# Patient Record
Sex: MALE | Race: WHITE | Employment: FULL TIME | URBAN - METROPOLITAN AREA
[De-identification: names, ages, dates, MRNs, and addresses within clinical notes are randomized per-mention and may not be internally consistent; named-entity substitution may affect disease eponyms.]

---

## 2016-12-16 LAB
ALBUMIN SERPL-MCNC: 4.1 G/DL
ALP BLD-CCNC: 111 U/L
ALT SERPL-CCNC: 78 U/L
AST SERPL-CCNC: 35 U/L
BILIRUB SERPL-MCNC: 0.6 MG/DL (ref 0.1–1.4)
BUN BLDV-MCNC: 15 MG/DL
CALCIUM SERPL-MCNC: 9.1 MG/DL
CHLORIDE BLD-SCNC: 106 MMOL/L
CHOLESTEROL, TOTAL: 166 MG/DL
CHOLESTEROL/HDL RATIO: 5.4
CO2: 25 MMOL/L
CREAT SERPL-MCNC: 1.09 MG/DL
CREATININE URINE: NORMAL MG/DL
GFR CALCULATED: NORMAL
GLUCOSE BLD-MCNC: 86 MG/DL
HDLC SERPL-MCNC: 31 MG/DL (ref 35–70)
LDL CHOLESTEROL CALCULATED: 90 MG/DL (ref 0–160)
MICROALBUMIN/CREAT 24H UR: <12 MG/G{CREAT}
POTASSIUM SERPL-SCNC: 4.6 MMOL/L
SODIUM BLD-SCNC: 142 MMOL/L
TOTAL PROTEIN: 6.9
TRIGL SERPL-MCNC: 224 MG/DL
VLDLC SERPL CALC-MCNC: 45 MG/DL

## 2017-03-13 ENCOUNTER — OFFICE VISIT (OUTPATIENT)
Dept: PRIMARY CARE CLINIC | Age: 37
End: 2017-03-13
Payer: COMMERCIAL

## 2017-03-13 VITALS
HEART RATE: 80 BPM | WEIGHT: 218 LBS | RESPIRATION RATE: 20 BRPM | BODY MASS INDEX: 31.21 KG/M2 | SYSTOLIC BLOOD PRESSURE: 140 MMHG | HEIGHT: 70 IN | DIASTOLIC BLOOD PRESSURE: 86 MMHG

## 2017-03-13 DIAGNOSIS — I10 ESSENTIAL HYPERTENSION: ICD-10-CM

## 2017-03-13 DIAGNOSIS — Z00.00 ENCOUNTER FOR GENERAL ADULT MEDICAL EXAMINATION W/O ABNORMAL FINDINGS: Primary | ICD-10-CM

## 2017-03-13 DIAGNOSIS — K58.0 IRRITABLE BOWEL SYNDROME WITH DIARRHEA: ICD-10-CM

## 2017-03-13 DIAGNOSIS — E78.2 MIXED HYPERLIPIDEMIA: ICD-10-CM

## 2017-03-13 PROCEDURE — 99385 PREV VISIT NEW AGE 18-39: CPT | Performed by: NURSE PRACTITIONER

## 2017-03-13 RX ORDER — ATORVASTATIN CALCIUM 80 MG/1
80 TABLET, FILM COATED ORAL DAILY
Qty: 21 TABLET | Refills: 0 | Status: SHIPPED | OUTPATIENT
Start: 2017-03-13 | End: 2017-03-13 | Stop reason: SDUPTHER

## 2017-03-13 RX ORDER — ESCITALOPRAM OXALATE 20 MG/1
20 TABLET ORAL DAILY
Qty: 21 TABLET | Refills: 0 | Status: SHIPPED | OUTPATIENT
Start: 2017-03-13 | End: 2017-03-13 | Stop reason: SDUPTHER

## 2017-03-13 RX ORDER — METOPROLOL SUCCINATE 50 MG/1
50 TABLET, EXTENDED RELEASE ORAL DAILY
Qty: 90 TABLET | Refills: 1 | Status: SHIPPED | OUTPATIENT
Start: 2017-03-13

## 2017-03-13 RX ORDER — ATORVASTATIN CALCIUM 80 MG/1
80 TABLET, FILM COATED ORAL DAILY
COMMUNITY
End: 2017-03-13 | Stop reason: SDUPTHER

## 2017-03-13 RX ORDER — METOPROLOL SUCCINATE 50 MG/1
50 TABLET, EXTENDED RELEASE ORAL DAILY
COMMUNITY
End: 2017-03-13 | Stop reason: SDUPTHER

## 2017-03-13 RX ORDER — ATORVASTATIN CALCIUM 80 MG/1
80 TABLET, FILM COATED ORAL DAILY
Qty: 90 TABLET | Refills: 1 | Status: SHIPPED | OUTPATIENT
Start: 2017-03-13

## 2017-03-13 RX ORDER — ESCITALOPRAM OXALATE 20 MG/1
20 TABLET ORAL DAILY
Qty: 90 TABLET | Refills: 1 | Status: SHIPPED | OUTPATIENT
Start: 2017-03-13 | End: 2017-08-14 | Stop reason: SDUPTHER

## 2017-03-13 RX ORDER — METOPROLOL SUCCINATE 50 MG/1
50 TABLET, EXTENDED RELEASE ORAL DAILY
Qty: 21 TABLET | Refills: 0 | Status: SHIPPED | OUTPATIENT
Start: 2017-03-13 | End: 2017-03-13 | Stop reason: SDUPTHER

## 2017-03-13 RX ORDER — ESCITALOPRAM OXALATE 20 MG/1
20 TABLET ORAL DAILY
COMMUNITY
End: 2017-03-13 | Stop reason: SDUPTHER

## 2017-03-13 ASSESSMENT — PATIENT HEALTH QUESTIONNAIRE - PHQ9
SUM OF ALL RESPONSES TO PHQ QUESTIONS 1-9: 0
1. LITTLE INTEREST OR PLEASURE IN DOING THINGS: 0
2. FEELING DOWN, DEPRESSED OR HOPELESS: 0
SUM OF ALL RESPONSES TO PHQ9 QUESTIONS 1 & 2: 0

## 2017-03-13 ASSESSMENT — ENCOUNTER SYMPTOMS
COUGH: 0
ABDOMINAL PAIN: 0
ORTHOPNEA: 0
SHORTNESS OF BREATH: 0
BLURRED VISION: 0
BACK PAIN: 0

## 2017-04-12 ENCOUNTER — OFFICE VISIT (OUTPATIENT)
Dept: FAMILY MEDICINE CLINIC | Age: 37
End: 2017-04-12
Payer: COMMERCIAL

## 2017-04-12 VITALS
RESPIRATION RATE: 16 BRPM | TEMPERATURE: 98.4 F | WEIGHT: 219 LBS | DIASTOLIC BLOOD PRESSURE: 70 MMHG | BODY MASS INDEX: 32.44 KG/M2 | SYSTOLIC BLOOD PRESSURE: 108 MMHG | HEIGHT: 69 IN | HEART RATE: 57 BPM

## 2017-04-12 DIAGNOSIS — R21 RASH AND NONSPECIFIC SKIN ERUPTION: ICD-10-CM

## 2017-04-12 DIAGNOSIS — I10 ESSENTIAL HYPERTENSION: Primary | ICD-10-CM

## 2017-04-12 DIAGNOSIS — Z11.4 SCREENING FOR HIV (HUMAN IMMUNODEFICIENCY VIRUS): ICD-10-CM

## 2017-04-12 DIAGNOSIS — E78.2 MIXED HYPERLIPIDEMIA: ICD-10-CM

## 2017-04-12 DIAGNOSIS — K58.0 IRRITABLE BOWEL SYNDROME WITH DIARRHEA: ICD-10-CM

## 2017-04-12 DIAGNOSIS — R74.8 ABNORMAL TRANSAMINASES: ICD-10-CM

## 2017-04-12 PROCEDURE — 99203 OFFICE O/P NEW LOW 30 MIN: CPT | Performed by: NURSE PRACTITIONER

## 2017-04-12 RX ORDER — UBIDECARENONE 200 MG
200 CAPSULE ORAL
COMMUNITY

## 2017-04-12 ASSESSMENT — ENCOUNTER SYMPTOMS
CONSTIPATION: 0
BACK PAIN: 0
RHINORRHEA: 0
DIARRHEA: 0
ABDOMINAL PAIN: 0
NAUSEA: 0
SORE THROAT: 0
SHORTNESS OF BREATH: 0
VOMITING: 0
COUGH: 0

## 2017-08-14 DIAGNOSIS — K58.0 IRRITABLE BOWEL SYNDROME WITH DIARRHEA: ICD-10-CM

## 2017-08-14 RX ORDER — ESCITALOPRAM OXALATE 20 MG/1
20 TABLET ORAL DAILY
Qty: 90 TABLET | Refills: 1 | Status: SHIPPED | OUTPATIENT
Start: 2017-08-14

## 2017-09-01 ENCOUNTER — OFFICE VISIT (OUTPATIENT)
Dept: FAMILY MEDICINE CLINIC | Age: 37
End: 2017-09-01
Payer: COMMERCIAL

## 2017-09-01 VITALS
WEIGHT: 219 LBS | RESPIRATION RATE: 16 BRPM | SYSTOLIC BLOOD PRESSURE: 115 MMHG | BODY MASS INDEX: 32.34 KG/M2 | DIASTOLIC BLOOD PRESSURE: 73 MMHG | TEMPERATURE: 97.4 F | HEART RATE: 55 BPM

## 2017-09-01 DIAGNOSIS — L60.0 INGROWN RIGHT BIG TOENAIL: ICD-10-CM

## 2017-09-01 DIAGNOSIS — Z23 NEED FOR INFLUENZA VACCINATION: ICD-10-CM

## 2017-09-01 DIAGNOSIS — L03.031 CELLULITIS OF RIGHT TOE: Primary | ICD-10-CM

## 2017-09-01 PROCEDURE — 90471 IMMUNIZATION ADMIN: CPT | Performed by: NURSE PRACTITIONER

## 2017-09-01 PROCEDURE — 90688 IIV4 VACCINE SPLT 0.5 ML IM: CPT | Performed by: NURSE PRACTITIONER

## 2017-09-01 PROCEDURE — 99213 OFFICE O/P EST LOW 20 MIN: CPT | Performed by: NURSE PRACTITIONER

## 2017-09-01 RX ORDER — CEPHALEXIN 500 MG/1
500 CAPSULE ORAL 3 TIMES DAILY
Qty: 30 CAPSULE | Refills: 0 | Status: SHIPPED | OUTPATIENT
Start: 2017-09-01 | End: 2017-09-11

## 2017-09-01 ASSESSMENT — ENCOUNTER SYMPTOMS
VOMITING: 0
SHORTNESS OF BREATH: 0
NAUSEA: 0
COLOR CHANGE: 1

## 2017-09-18 ENCOUNTER — TELEPHONE (OUTPATIENT)
Dept: PRIMARY CARE CLINIC | Age: 37
End: 2017-09-18

## 2018-04-02 ENCOUNTER — OFFICE VISIT (OUTPATIENT)
Dept: PRIMARY CARE CLINIC | Age: 38
End: 2018-04-02
Payer: COMMERCIAL

## 2018-04-02 VITALS
SYSTOLIC BLOOD PRESSURE: 122 MMHG | RESPIRATION RATE: 16 BRPM | DIASTOLIC BLOOD PRESSURE: 78 MMHG | BODY MASS INDEX: 32.73 KG/M2 | TEMPERATURE: 97.6 F | WEIGHT: 221 LBS | HEIGHT: 69 IN | HEART RATE: 60 BPM

## 2018-04-02 DIAGNOSIS — Z00.00 ROUTINE GENERAL MEDICAL EXAMINATION AT A HEALTH CARE FACILITY: Primary | ICD-10-CM

## 2018-04-02 DIAGNOSIS — R73.03 PREDIABETES: ICD-10-CM

## 2018-04-02 DIAGNOSIS — E55.9 VITAMIN D DEFICIENCY: ICD-10-CM

## 2018-04-02 DIAGNOSIS — K58.0 IRRITABLE BOWEL SYNDROME WITH DIARRHEA: ICD-10-CM

## 2018-04-02 DIAGNOSIS — E78.2 MIXED HYPERLIPIDEMIA: ICD-10-CM

## 2018-04-02 DIAGNOSIS — I10 ESSENTIAL HYPERTENSION: ICD-10-CM

## 2018-04-02 DIAGNOSIS — R49.0 MUSCLE TENSION DYSPHONIA: ICD-10-CM

## 2018-04-02 PROCEDURE — 99395 PREV VISIT EST AGE 18-39: CPT | Performed by: NURSE PRACTITIONER

## 2018-04-02 RX ORDER — EZETIMIBE 10 MG/1
10 TABLET ORAL
COMMUNITY

## 2018-04-02 ASSESSMENT — PATIENT HEALTH QUESTIONNAIRE - PHQ9
SUM OF ALL RESPONSES TO PHQ QUESTIONS 1-9: 0
2. FEELING DOWN, DEPRESSED OR HOPELESS: 0
1. LITTLE INTEREST OR PLEASURE IN DOING THINGS: 0
SUM OF ALL RESPONSES TO PHQ9 QUESTIONS 1 & 2: 0

## 2018-09-20 ENCOUNTER — OFFICE VISIT (OUTPATIENT)
Dept: PRIMARY CARE CLINIC | Age: 38
End: 2018-09-20
Payer: COMMERCIAL

## 2018-09-20 VITALS
DIASTOLIC BLOOD PRESSURE: 82 MMHG | SYSTOLIC BLOOD PRESSURE: 130 MMHG | HEART RATE: 67 BPM | OXYGEN SATURATION: 95 % | BODY MASS INDEX: 31.92 KG/M2 | HEIGHT: 70 IN | WEIGHT: 223 LBS

## 2018-09-20 DIAGNOSIS — R21 RASH: Primary | ICD-10-CM

## 2018-09-20 PROCEDURE — 99214 OFFICE O/P EST MOD 30 MIN: CPT | Performed by: NURSE PRACTITIONER

## 2018-09-20 RX ORDER — CLOTRIMAZOLE AND BETAMETHASONE DIPROPIONATE 10; .64 MG/G; MG/G
CREAM TOPICAL
Qty: 1 TUBE | Refills: 1 | Status: SHIPPED | OUTPATIENT
Start: 2018-09-20

## 2018-09-20 ASSESSMENT — ENCOUNTER SYMPTOMS
COLOR CHANGE: 1
NAUSEA: 0
TROUBLE SWALLOWING: 0
VOMITING: 0
WHEEZING: 0
RHINORRHEA: 0
SHORTNESS OF BREATH: 0

## 2018-09-20 NOTE — PROGRESS NOTES
[facility]  [address] [place] [address] Dr Edith Kehr [address] [facility] [address]  Dept: [phone]  Dept Fax: [phone]    Jovany Arritea is a 41 y.o. male who presents today for his medical conditions/complaints as noted below. Jovany Arrieta is c/o of Rash (under arms, tried organic deoderant, causing swelling and hives, under arm changed color)        HPI:     Rash   This is a new problem. The current episode started 1 to 4 weeks ago. The affected locations include the left axilla and right axilla. The rash is characterized by redness and itchiness. He was exposed to a new detergent/soap. Pertinent negatives include no anorexia, congestion, facial edema, fatigue, fever, joint pain, rhinorrhea, shortness of breath or vomiting. Treatments tried: stopped deodarant kept dry. The treatment provided significant (redness and itching improved but still increaed pigment to area) relief.        Past Medical History:   Diagnosis Date    Hyperlipidemia     Hypertension     IBS (irritable bowel syndrome)     Mitral valve prolapse     Muscle tension dysphonia     Stroke [facility])       Past Surgical History:   Procedure Laterality Date    LIVER BIOPSY      SKIN BIOPSY         Family History   Problem Relation Age of Onset    Cancer Father         melanoma    Other Father        Social History   Substance Use Topics    Smoking status: Never Smoker    Smokeless tobacco: Never Used    Alcohol use Yes      Comment: 3 -4 drinks/week      Current Outpatient Prescriptions   Medication Sig Dispense Refill    clotrimazole-betamethasone (LOTRISONE) 1-0.05 % cream Apply externally bid x 7 days 1 Tube 1    ezetimibe (ZETIA) 10 MG tablet Take 10 mg by mouth      escitalopram (LEXAPRO) 20 MG tablet Take 1 tablet by mouth daily 90 tablet 1    aspirin 81 MG tablet Take by mouth      Coenzyme Q10 200 MG CAPS Take 200 mg by mouth      Omega-3 Fatty Acids (FISH OIL OMEGA-3 PO) Take by mouth      atorvastatin (LIPITOR) 80 MG tablet Take 1 tablet by mouth daily 90 tablet 1    metoprolol succinate (TOPROL XL) 50 MG extended release tablet Take 1 tablet by mouth daily 90 tablet 1     No current facility-administered medications for this visit. Allergies   Allergen Reactions    Azithromycin     Chocolate     Erythromycin Swelling       Health Maintenance   Topic Date Due    A1C test (Diabetic or Prediabetic)  08/05/1992    HIV screen  08/05/1997    Potassium monitoring  12/16/2017    Creatinine monitoring  12/16/2017    Flu vaccine (1) 09/01/2018    DTaP/Tdap/Td vaccine (2 - Tdap) 08/24/2027       Subjective:      Review of Systems   Constitutional: Negative for chills, fatigue and fever. HENT: Negative for congestion, mouth sores, rhinorrhea and trouble swallowing. Eyes: Negative for visual disturbance. Respiratory: Negative for shortness of breath and wheezing. Cardiovascular: Negative for chest pain. Gastrointestinal: Negative for anorexia, nausea and vomiting. Musculoskeletal: Negative for joint pain, neck pain and neck stiffness. Skin: Positive for color change and rash. Neurological: Negative for dizziness and light-headedness. Hematological: Negative for adenopathy. Objective:     Physical Exam   Constitutional: He is oriented to person, place, and time. He appears well-developed and well-nourished. No distress. HENT:   Head: Normocephalic and atraumatic. Right Ear: External ear normal.   Left Ear: External ear normal.   Nose: Nose normal.   Mouth/Throat: Uvula is midline, oropharynx is clear and moist and mucous membranes are normal. No oral lesions. Eyes: Pupils are equal, round, and reactive to light. EOM are normal. Right eye exhibits no discharge. Left eye exhibits no discharge. Neck: Normal range of motion. Neck supple. Cardiovascular: Normal rate, regular rhythm and normal heart sounds. Exam reveals no gallop and no friction rub.     No murmur heard.  Pulmonary/Chest: Breath sounds normal. No respiratory distress. He has no wheezes. Musculoskeletal: Normal range of motion. Neurological: He is alert and oriented to person, place, and time. No cranial nerve deficit. Coordination normal.   Skin: Skin is warm and dry. Rash noted. He is not diaphoretic. Patch type hyperpigmented area to bilateral axilla no draining or increased warmth    Psychiatric: He has a normal mood and affect. His behavior is normal. Judgment and thought content normal.   Nursing note and vitals reviewed. /82   Pulse 67   Ht 5' 10\" (1.778 m)   Wt 223 lb (101.2 kg)   SpO2 95%   BMI 32.00 kg/m²     Assessment:       Diagnosis Orders   1. Rash  clotrimazole-betamethasone (LOTRISONE) 1-0.05 % cream       Plan:      Julisa Pearl presents the office with acute complaint of rash to his underarms. He had previously tried a different deodorant that was organic he broke out in severe rash right after this and had itching. He stopped using it that has improved he states he does still has a pigment change. At this point we'll treat with Lotrisone possible fungal or still contact. Use as directed continue to monitor inform us if symptoms are worsening or not improving. Keep area dry and clean. Avoid irritants. Return if symptoms worsen or fail to improve. Orders Placed This Encounter   Medications    clotrimazole-betamethasone (LOTRISONE) 1-0.05 % cream     Sig: Apply externally bid x 7 days     Dispense:  1 Tube     Refill:  1       Patient given educational materials - see patient instructions. Discussed use, benefit, and side effects of prescribed medications. All patient questions answered. Pt voiced understanding. Reviewed health maintenance. Instructed to continue current medications, diet and exercise. Patient agreed with treatment plan. Follow up as directed.       Electronically signed by KALEN Patel CNP on 9/20/2018 at 9:43 AM         Of the 15 minute duration appointment visit, Joanna Posadas CNP spent at least 50% of the face-to-face time in counseling, explanation of diagnosis, planning of further management, and answering all questions.

## 2018-09-20 NOTE — PATIENT INSTRUCTIONS
clothing. Use this medicine for the full prescribed length of time. Your symptoms may improve before the infection is completely cleared. Skipping doses may also increase your risk of further infection that is resistant to antifungal medicine. Call your doctor if your symptoms get worse, or if your condition does not improve after 4 weeks of treatment. Store at room temperature away from moisture and heat. What happens if I miss a dose? Apply the missed dose as soon as you remember. Skip the missed dose if it is almost time for your next scheduled dose. Do not use extra medicine to make up the missed dose. What happens if I overdose? An overdose of clotrimazole topical is not expected to be dangerous. Seek emergency medical attention or call the Poison Help line at 1-609.100.8890 if anyone has accidentally swallowed the medication. What should I avoid while using clotrimazole topical?  Avoid getting this medication in your eyes, nose, or mouth. Avoid using other skin medications on the areas you treat with clotrimazole topical unless your doctor tells you to. Avoid wearing tight-fitting, synthetic clothing that doesn't allow air circulation. Wear clothing made of loose cotton and other natural fibers until the infection is healed. What are the possible side effects of clotrimazole topical?  Get emergency medical help if you have signs of an allergic reaction: hives; difficult breathing; swelling of your face, lips, tongue, or throat. Stop using clotrimazole topical and call your doctor at once if you have:  · severe blistering of treated skin;  · swelling, redness, or oozing; or  · severe burning, itching, or other irritation. This is not a complete list of side effects and others may occur. Call your doctor for medical advice about side effects. You may report side effects to FDA at 1-124-BEV-9065.   What other drugs will affect clotrimazole topical?  It is not likely that other drugs you take orally or inject will have an effect on topically applied clotrimazole. But many drugs can interact with each other. Tell each of your health care providers about all medicines you use, including prescription and over-the-counter medicines, vitamins, and herbal products. Where can I get more information? Your pharmacist can provide more information about clotrimazole topical.    Remember, keep this and all other medicines out of the reach of children, never share your medicines with others, and use this medication only for the indication prescribed. Every effort has been made to ensure that the information provided by [address] is accurate, up-to-date, and complete, but no guarantee is made to that effect. Drug information contained herein may be time sensitive. [facility] information has been compiled for use by healthcare practitioners and consumers in the [place] and therefore [facility] does not warrant that uses outside of the [place] are appropriate, unless specifically indicated otherwise. [facility]'s drug information does not endorse drugs, diagnose patients or recommend therapy. [facility] drug information is an informational resource designed to assist licensed healthcare practitioners in caring for their patients and/or to serve consumers viewing this service as a supplement to, and not a substitute for, the expertise, skill, knowledge and judgment of healthcare practitioners. The absence of a warning for a given drug or drug combination in no way should be construed to indicate that the drug or drug combination is safe, effective or appropriate for any given patient. [facility] does not assume any responsibility for any aspect of healthcare administered with the aid of information [facility] provides. The information contained herein is not intended to cover all possible uses, directions, precautions, warnings, drug interactions, allergic reactions, or adverse effects.  If you have questions about